# Patient Record
Sex: MALE | Race: WHITE
[De-identification: names, ages, dates, MRNs, and addresses within clinical notes are randomized per-mention and may not be internally consistent; named-entity substitution may affect disease eponyms.]

---

## 2019-03-23 ENCOUNTER — HOSPITAL ENCOUNTER (EMERGENCY)
Dept: HOSPITAL 62 - ER | Age: 79
Discharge: ANOTHER HOSPITAL | End: 2019-03-23
Payer: OTHER GOVERNMENT

## 2019-03-23 VITALS — SYSTOLIC BLOOD PRESSURE: 164 MMHG | DIASTOLIC BLOOD PRESSURE: 109 MMHG

## 2019-03-23 DIAGNOSIS — R68.84: ICD-10-CM

## 2019-03-23 DIAGNOSIS — M54.9: ICD-10-CM

## 2019-03-23 DIAGNOSIS — R07.9: ICD-10-CM

## 2019-03-23 DIAGNOSIS — I21.4: Primary | ICD-10-CM

## 2019-03-23 LAB
ADD MANUAL DIFF: NO
ALBUMIN SERPL-MCNC: 4.5 G/DL (ref 3.5–5)
ALP SERPL-CCNC: 68 U/L (ref 38–126)
ALT SERPL-CCNC: 26 U/L (ref 21–72)
ANION GAP SERPL CALC-SCNC: 11 MMOL/L (ref 5–19)
APPEARANCE UR: CLEAR
APTT PPP: (no result) S
AST SERPL-CCNC: 40 U/L (ref 17–59)
BASOPHILS # BLD AUTO: 0.1 10^3/UL (ref 0–0.2)
BASOPHILS NFR BLD AUTO: 0.8 % (ref 0–2)
BILIRUB DIRECT SERPL-MCNC: 0.3 MG/DL (ref 0–0.4)
BILIRUB SERPL-MCNC: 0.5 MG/DL (ref 0.2–1.3)
BILIRUB UR QL STRIP: NEGATIVE
BUN SERPL-MCNC: 18 MG/DL (ref 7–20)
CALCIUM: 9.8 MG/DL (ref 8.4–10.2)
CHLORIDE SERPL-SCNC: 100 MMOL/L (ref 98–107)
CK MB SERPL-MCNC: 2.51 NG/ML (ref ?–4.55)
CK SERPL-CCNC: 72 U/L (ref 55–170)
CO2 SERPL-SCNC: 29 MMOL/L (ref 22–30)
EOSINOPHIL # BLD AUTO: 0.1 10^3/UL (ref 0–0.6)
EOSINOPHIL NFR BLD AUTO: 1.7 % (ref 0–6)
ERYTHROCYTE [DISTWIDTH] IN BLOOD BY AUTOMATED COUNT: 13.7 % (ref 11.5–14)
GLUCOSE SERPL-MCNC: 94 MG/DL (ref 75–110)
GLUCOSE UR STRIP-MCNC: NEGATIVE MG/DL
HCT VFR BLD CALC: 45.2 % (ref 37.9–51)
HGB BLD-MCNC: 15.5 G/DL (ref 13.5–17)
KETONES UR STRIP-MCNC: NEGATIVE MG/DL
LIPASE SERPL-CCNC: 124.3 U/L (ref 23–300)
LYMPHOCYTES # BLD AUTO: 1.3 10^3/UL (ref 0.5–4.7)
LYMPHOCYTES NFR BLD AUTO: 15.6 % (ref 13–45)
MCH RBC QN AUTO: 31 PG (ref 27–33.4)
MCHC RBC AUTO-ENTMCNC: 34.3 G/DL (ref 32–36)
MCV RBC AUTO: 90 FL (ref 80–97)
MONOCYTES # BLD AUTO: 0.5 10^3/UL (ref 0.1–1.4)
MONOCYTES NFR BLD AUTO: 6.5 % (ref 3–13)
NEUTROPHILS # BLD AUTO: 6.2 10^3/UL (ref 1.7–8.2)
NEUTS SEG NFR BLD AUTO: 75.4 % (ref 42–78)
NITRITE UR QL STRIP: NEGATIVE
PH UR STRIP: 7 [PH] (ref 5–9)
PLATELET # BLD: 289 10^3/UL (ref 150–450)
POTASSIUM SERPL-SCNC: 4 MMOL/L (ref 3.6–5)
PROT SERPL-MCNC: 8 G/DL (ref 6.3–8.2)
PROT UR STRIP-MCNC: NEGATIVE MG/DL
RBC # BLD AUTO: 5 10^6/UL (ref 4.35–5.55)
SODIUM SERPL-SCNC: 139.7 MMOL/L (ref 137–145)
SP GR UR STRIP: 1
TOTAL CELLS COUNTED % (AUTO): 100 %
TROPONIN I SERPL-MCNC: 0.22 NG/ML
UROBILINOGEN UR-MCNC: NEGATIVE MG/DL (ref ?–2)
WBC # BLD AUTO: 8.3 10^3/UL (ref 4–10.5)

## 2019-03-23 PROCEDURE — 93010 ELECTROCARDIOGRAM REPORT: CPT

## 2019-03-23 PROCEDURE — 84484 ASSAY OF TROPONIN QUANT: CPT

## 2019-03-23 PROCEDURE — 81001 URINALYSIS AUTO W/SCOPE: CPT

## 2019-03-23 PROCEDURE — 76705 ECHO EXAM OF ABDOMEN: CPT

## 2019-03-23 PROCEDURE — 36415 COLL VENOUS BLD VENIPUNCTURE: CPT

## 2019-03-23 PROCEDURE — 71046 X-RAY EXAM CHEST 2 VIEWS: CPT

## 2019-03-23 PROCEDURE — 99291 CRITICAL CARE FIRST HOUR: CPT

## 2019-03-23 PROCEDURE — 80053 COMPREHEN METABOLIC PANEL: CPT

## 2019-03-23 PROCEDURE — 85025 COMPLETE CBC W/AUTO DIFF WBC: CPT

## 2019-03-23 PROCEDURE — 96376 TX/PRO/DX INJ SAME DRUG ADON: CPT

## 2019-03-23 PROCEDURE — 96366 THER/PROPH/DIAG IV INF ADDON: CPT

## 2019-03-23 PROCEDURE — 93005 ELECTROCARDIOGRAM TRACING: CPT

## 2019-03-23 PROCEDURE — 82553 CREATINE MB FRACTION: CPT

## 2019-03-23 PROCEDURE — 83690 ASSAY OF LIPASE: CPT

## 2019-03-23 PROCEDURE — 82550 ASSAY OF CK (CPK): CPT

## 2019-03-23 PROCEDURE — 96365 THER/PROPH/DIAG IV INF INIT: CPT

## 2019-03-23 NOTE — RADIOLOGY REPORT (SQ)
EXAM DESCRIPTION:  U/S ABDOMEN LIMITED W/O DOP



COMPLETED DATE/TIME:  3/23/2019 2:58 pm



REASON FOR STUDY:  back pain/chest pain after eating



COMPARISON:  None.



TECHNIQUE:  Dynamic and static grayscale images acquired of the abdomen and recorded on PACS. Michaelo
mariel selected color Doppler and spectral images recorded.



LIMITATIONS:  Study was terminated before complete evaluation due to patient condition.



FINDINGS:  PANCREAS: No masses.  Visualized pancreatic duct normal caliber.

LIVER: Probable fatty parenchyma.  2.7 cm cyst in the right lobe without suspicious features.

LIVER VASCULATURE: Normal directional flow of the main portal vein and hepatic veins.

GALLBLADDER: Stones are present.  No gross wall thickening or pericholecystic fluid.

ULTRASOUND-DETECTED MEYERS'S SIGN: Positive per technologist.

INTRAHEPATIC DUCTS AND COMMON DUCT: CBD and intrahepatic ducts normal caliber. No filling defects.

INFERIOR VENA CAVA: Normal flow.

AORTA: No aneurysm.

RIGHT KIDNEY:  Sizable cysts measuring over 7 cm.  Limited assessment.

PERITONEAL AND RIGHT PLEURAL SPACE: No ascites or effusions.

OTHER: No other significant findings.



IMPRESSION:

1. Cholelithiasis.  Although there is no wall thickening or pericholecystic fluid, the patient has te
nderness reported by the sonographer.  This could reflect acute cholecystitis.

2. Other findings as above.



TECHNICAL DOCUMENTATION:  JOB ID:  [id number]

 2011 [employer]- All Rights Reserved



Reading location - IP/workstation name: [email or web address]

## 2019-03-23 NOTE — EKG REPORT
SEVERITY:- ABNORMAL ECG -

SINUS RHYTHM

LAD, CONSIDER LEFT ANTERIOR FASCICULAR BLOCK

PROBABLE ANTEROSEPTAL INFARCT, AGE INDETERM

:

Confirmed by: Ricci Claudio 23-Mar-2019 22:12:09

## 2019-03-23 NOTE — EKG REPORT
SEVERITY:- ABNORMAL ECG -

SINUS RHYTHM

LAD, CONSIDER LEFT ANTERIOR FASCICULAR BLOCK

PROBABLE ANTEROSEPTAL INFARCT, AGE INDETERM

ABNORMAL T, CONSIDER ISCHEMIA, ANT-LAT LEADS

:

Confirmed by: Ricci Claudio 23-Mar-2019 22:11:22

## 2019-03-23 NOTE — ER DOCUMENT REPORT
ED Medical Screen (RME)





- General


Chief Complaint: Chest Pain


Stated Complaint: UPPER CHEAT AND BACK PAIN


Time Seen by Provider: 03/23/19 12:53


Mode of Arrival: Ambulatory


Information source: Patient


TRAVEL OUTSIDE OF THE [place] IN LAST 30 DAYS: No





- HPI


Patient complains to provider of: chest, neck and LBP


Onset: Other - pt. with episodes of chest, posterior neck and LBP for  the past 

few days with exacerbation this am.  Did not take ASA today.





- Related Data


Allergies/Adverse Reactions: 


                                        





No Known Allergies Allergy (Verified 03/23/19 12:29)


   











Physical Exam





- Vital signs


Vitals: 





                                        











Temp Pulse Resp BP Pulse Ox


 


 98.1 F   88   16   167/91 H  98 


 


 03/23/19 12:47  03/23/19 12:47  03/23/19 12:47  03/23/19 12:47  03/23/19 12:47














Course





- Vital Signs


Vital signs: 





                                        











Temp Pulse Resp BP Pulse Ox


 


 98.1 F   88   16   167/91 H  98 


 


 03/23/19 12:47  03/23/19 12:47  03/23/19 12:47  03/23/19 12:47  03/23/19 12:47

## 2019-03-23 NOTE — RADIOLOGY REPORT (SQ)
EXAM DESCRIPTION:  CHEST 2 VIEWS



COMPLETED DATE/TIME:  3/23/2019 1:31 pm



REASON FOR STUDY:  CP



COMPARISON:  None.



TECHNIQUE:  Frontal and lateral radiographic views of the chest acquired.



NUMBER OF VIEWS:  Two view.



LIMITATIONS:  None.



FINDINGS:  LUNGS AND PLEURA: No opacities, masses or pneumothorax. No pleural effusion.

MEDIASTINUM AND HILAR STRUCTURES: No masses or contour abnormalities.

HEART AND VASCULAR STRUCTURES: Heart normal size.  No evidence for failure.

BONES: No acute findings.

HARDWARE: None in the chest.

OTHER: No other significant finding.



IMPRESSION:  NO SIGNIFICANT RADIOGRAPHIC FINDING IN THE CHEST.



TECHNICAL DOCUMENTATION:  JOB ID:  [id number]

 2011 [email or web address]- All Rights Reserved



Reading location - IP/workstation name: [email or web address]

## 2019-03-23 NOTE — ER DOCUMENT REPORT
ED General





- General


Chief Complaint: Chest Pain


Stated Complaint: UPPER CHEAT AND BACK PAIN


Time Seen by Provider: 03/23/19 12:53


Primary Care Provider: 


[facility] [Primary Care Provider] - Follow up as needed


Mode of Arrival: Ambulatory


TRAVEL OUTSIDE OF THE [place] IN LAST 30 DAYS: No





- HPI


Notes: 





Patient is a 78-year-old male with no significant past medical history who 

presents emergency department complaining of upper chest pain, jaw pain, and low

back pain after eating.  Patient states that this is been ongoing for the last 

week intermittently.  He notices his symptoms flaring up after he eats food.  

The last episode was prior to arrival when he ranker milkshake.  Patient states 

that the pain does not radiate otherwise.  The pain has currently subsided and 

he is feeling well at this time.  He is urinating normally and having normal 

bowel movements.  Denies drug allergies.  No significant cardiopulmonary medical

history.  No surgical history to his abdomen.  Denies any prolonged 

immobilization, distance travel, recent surgery/trauma, personal cancer history,

hormone use, smoking, or previous DVT/PE.  Denies any headache, fever, neck p

ain, URI, sore throat, palpitations, syncope, cough, shortness of breath, 

wheeze, dyspnea, abdominal pain, nausea/vomiting/diarrhea, urinary retention, 

dysuria, hematuria, loss of control of bowel or bladder, numbness/tingling, 

saddle anesthesia, muscle paralysis/weakness, or rash.





- Related Data


Allergies/Adverse Reactions: 


                                        





No Known Allergies Allergy (Verified 03/23/19 12:29)


   











Past Medical History





- General


Information source: Patient





- Social History


Smoking Status: Never Smoker


Chew tobacco use (# tins/day): No


Frequency of alcohol use: Rare


Drug Abuse: None


Family History: Reviewed & Not Pertinent


Patient has suicidal ideation: No


Patient has homicidal ideation: No


Renal/ Medical History: Denies: Hx Peritoneal Dialysis


Past Surgical History: Reports: Hx Tonsillectomy





Review of Systems





- Review of Systems


-: Yes All other systems reviewed and negative





Physical Exam





- Vital signs


Vitals: 


                                        











Temp Pulse Resp BP Pulse Ox


 


 98.1 F   88   16   167/91 H  98 


 


 03/23/19 12:47  03/23/19 12:47  03/23/19 12:47  03/23/19 12:47  03/23/19 12:47














- Notes


Notes: 





PHYSICAL EXAMINATION:





GENERAL: Well-appearing, well-nourished and in no acute distress.  Pt is very 

pleasant, happy, laughing, comfortable.





HEAD: Atraumatic, normocephalic.





EYES: Pupils equal round and reactive to light, extraocular movements intact, 

sclera anicteric, conjunctiva are normal.





ENT:  Nares patent and without discharge.  oropharynx clear without exudates.  

Tonsils absent.  Moist mucous membranes. 





NECK: Normal range of motion, supple without lymphadenopathy





LUNGS: Breath sounds clear to auscultation bilaterally and equal.  No wheezes 

rales or rhonchi.





HEART: Regular rate and rhythm without murmurs, rubs, gallops.





ABDOMEN: Soft, nontender, nondistended abdomen.  No guarding, no rebound.  

Normal bowel sounds present.  No CVA tenderness bilaterally.  Juárez neg.  





Musculoskeletal: FROM to passive/active. Strength 5+/5. Sherita neg.  No asymmetry

to LE's.





Extremities:  No cyanosis, clubbing, or edema b/l.  Peripheral pulses 2+.  

Capillary refill less than 3 seconds.





NEUROLOGICAL: Normal speech, normal gait.  





PSYCH: Normal mood, normal affect.





SKIN: Warm, Dry, normal turgor, no rashes or lesions noted.





Course





- Re-evaluation


Re-evalutation: 





03/23/19 14:45


Pt has worsening T wave inversions on EKG with a troponin of 0.221.


Pt was ordered a heparin bolus/drip as well as a nitro drip (pt pain currently 

mild 1/5).  Aspirin was given at triage.


Call placed to [facility] for transfer and am waiting for a call back, but they 

currently do not have any beds.


I called [facility] just a little while ago for another pt with similar situation 

and they do not have cardiac beds.





03/23/14:55


Call placed to [facility] who will call me back but state that they do have cardiac 

beds available.


Pt is open to transfer anywhere at this time.


Case being reviewed with Dr. Sanchez who is in agreement with transfer/plan.





03/23/19 15:10


I was able to speak with [facility], Dr. Jennings, who recommends Lipitor 80, 

Toprol 25, morphine for pain and accepted transfer to their facility.


Pt is in agreement with plan.  [facility] transfer cancelled.  


Pt has no new concerns or complaints and is relatively high spirits.





03/23/19 17:30


Pt has no new concerns or complaints.


Vitals acceptable.


Pt stable for transport.





- Vital Signs


Vital signs: 


                                        











Temp Pulse Resp BP Pulse Ox


 


 98.1 F   88   11 L  164/109 H  96 


 


 03/23/19 12:47  03/23/19 12:47  03/23/19 15:01  03/23/19 15:00  03/23/19 15:01














- Laboratory


Result Diagrams: 


                                 03/23/19 13:49





                                 03/23/19 13:49


Laboratory results interpreted by me: 


                                        











  03/23/19





  15:30


 


Urine Blood  SMALL H


 


Ur Leukocyte Esterase  TRACE H














Critical Care Note





- Critical Care Note


Total time excluding time spent on procedures (mins): 37





Discharge





- Discharge


Clinical Impression: 


 NSTEMI (non-ST elevated myocardial infarction)





Condition: Stable


Disposition: [facility]


Referrals: 


[facility] [Primary Care Provider] - Follow up as needed